# Patient Record
Sex: FEMALE | Race: WHITE
[De-identification: names, ages, dates, MRNs, and addresses within clinical notes are randomized per-mention and may not be internally consistent; named-entity substitution may affect disease eponyms.]

---

## 2018-05-08 ENCOUNTER — HOSPITAL ENCOUNTER (OUTPATIENT)
Dept: HOSPITAL 44 - LAB | Age: 58
End: 2018-05-08
Attending: FAMILY MEDICINE
Payer: COMMERCIAL

## 2018-05-08 DIAGNOSIS — L03.119: Primary | ICD-10-CM

## 2018-05-08 LAB
BASOPHILS NFR BLD: 0.5 % (ref 0–1.5)
EOSINOPHIL NFR BLD: 2.4 % (ref 0–6.8)
MCH RBC QN AUTO: 29.9 PG (ref 28–34)
MCV RBC AUTO: 93.3 FL (ref 80–100)
MONOCYTES %: 4.7 % (ref 0–11)
NEUTROPHILS #: 6.8 # K/UL (ref 1.4–7.7)

## 2018-05-08 PROCEDURE — 87040 BLOOD CULTURE FOR BACTERIA: CPT

## 2018-05-08 PROCEDURE — 85025 COMPLETE CBC W/AUTO DIFF WBC: CPT

## 2018-05-09 ENCOUNTER — HOSPITAL ENCOUNTER (OUTPATIENT)
Dept: HOSPITAL 44 - LAB | Age: 58
End: 2018-05-09
Attending: FAMILY MEDICINE
Payer: COMMERCIAL

## 2018-05-09 DIAGNOSIS — D64.9: Primary | ICD-10-CM

## 2018-05-09 LAB
EGFR (AFRICAN): 24
EGFR (NON-AFRICAN): 20

## 2018-05-09 PROCEDURE — 36415 COLL VENOUS BLD VENIPUNCTURE: CPT

## 2018-05-09 PROCEDURE — 82607 VITAMIN B-12: CPT

## 2018-05-09 PROCEDURE — 80048 BASIC METABOLIC PNL TOTAL CA: CPT

## 2018-05-09 PROCEDURE — 83550 IRON BINDING TEST: CPT

## 2018-05-09 PROCEDURE — 83540 ASSAY OF IRON: CPT

## 2018-05-09 PROCEDURE — 82746 ASSAY OF FOLIC ACID SERUM: CPT

## 2018-05-10 LAB
IRON SERPL-MCNC: 20 UG/DL (ref 37–145)
VIT B12 SERPL-MCNC: 478 PG/ML (ref 211–946)

## 2018-05-21 ENCOUNTER — HOSPITAL ENCOUNTER (OUTPATIENT)
Dept: HOSPITAL 44 - LAB | Age: 58
End: 2018-05-21
Attending: FAMILY MEDICINE
Payer: COMMERCIAL

## 2018-05-21 DIAGNOSIS — D64.9: Primary | ICD-10-CM

## 2018-05-21 DIAGNOSIS — R79.89: ICD-10-CM

## 2018-05-21 LAB
APPEARANCE UR: CLEAR
COLOR,URINE: YELLOW
EGFR (AFRICAN): > 60
EGFR (NON-AFRICAN): > 60
MCH RBC QN AUTO: 29.2 PG (ref 28–34)
MCV RBC AUTO: 94.3 FL (ref 80–100)
PH UR STRIP: 5.5 [PH] (ref 5–8)
RBC UR QL: NEGATIVE
UROBILINOGEN URINE: 0.2 EU (ref 0.2–1)

## 2018-05-21 PROCEDURE — 36415 COLL VENOUS BLD VENIPUNCTURE: CPT

## 2018-05-21 PROCEDURE — 80048 BASIC METABOLIC PNL TOTAL CA: CPT

## 2018-05-21 PROCEDURE — 85651 RBC SED RATE NONAUTOMATED: CPT

## 2018-05-21 PROCEDURE — 85027 COMPLETE CBC AUTOMATED: CPT

## 2018-05-21 PROCEDURE — 81002 URINALYSIS NONAUTO W/O SCOPE: CPT

## 2018-06-07 ENCOUNTER — HOSPITAL ENCOUNTER (OUTPATIENT)
Dept: HOSPITAL 44 - RAD | Age: 58
End: 2018-06-07
Attending: DERMATOLOGY
Payer: COMMERCIAL

## 2018-06-07 DIAGNOSIS — L52: Primary | ICD-10-CM

## 2018-06-07 LAB
BASOPHILS NFR BLD: 1 % (ref 0–1.5)
EGFR (AFRICAN): > 60
EGFR (NON-AFRICAN): > 60
EOSINOPHIL NFR BLD: 2.9 % (ref 0–6.8)
MCH RBC QN AUTO: 29.8 PG (ref 28–34)
MCV RBC AUTO: 91.4 FL (ref 80–100)
MONOCYTES %: 3.7 % (ref 0–11)
NEUTROPHILS #: 4.2 # K/UL (ref 1.4–7.7)

## 2018-06-07 PROCEDURE — 36415 COLL VENOUS BLD VENIPUNCTURE: CPT

## 2018-06-07 PROCEDURE — 85651 RBC SED RATE NONAUTOMATED: CPT

## 2018-06-07 PROCEDURE — 80053 COMPREHEN METABOLIC PANEL: CPT

## 2018-06-07 PROCEDURE — 86060 ANTISTREPTOLYSIN O TITER: CPT

## 2018-06-07 PROCEDURE — 71046 X-RAY EXAM CHEST 2 VIEWS: CPT

## 2018-06-07 PROCEDURE — 85025 COMPLETE CBC W/AUTO DIFF WBC: CPT

## 2018-06-07 PROCEDURE — 86480 TB TEST CELL IMMUN MEASURE: CPT

## 2025-04-24 NOTE — DIAGNOSTIC IMAGING REPORT
[facility]

[address] [facility] [address]

[place], [place]. [address]

 

 

 

 

Report Submission Date: 2018 9:50:10 AM CDT

Patient       Study

Name:   VAIBHAV CARVER       Date:   2018 9:28:27 AM CDT

MRN:   [id number]       Modality Type:   DX

Gender:   F       Description:   CHEST

:   [birth year]       Institution:   [facility]

Physician:   PAULA LESTER

     Accession:    [id number]

 

 

Examination: PA and lateral chest. 



History: Evaluate lung fields. CXR, ERYTHEMA NODOSUM, NO CHEST COMPLAINTS (Hx) 



Comparison exam: None provided. 



Findings: PA lateral chest demonstrate a normal cardiac and mediastinal 
silhouette. No focal infiltrate.  No blunting of the costophrenic margins.  
Osseous structures are appropriate for age. 



Impression: No acute pulmonary process.

 

Electronically signed on 2018 9:50:10 AM CDT by:

Jethro LAW
Otc Regimen: moisturizer
Detail Level: Zone